# Patient Record
Sex: FEMALE | Race: WHITE | NOT HISPANIC OR LATINO | URBAN - METROPOLITAN AREA
[De-identification: names, ages, dates, MRNs, and addresses within clinical notes are randomized per-mention and may not be internally consistent; named-entity substitution may affect disease eponyms.]

---

## 2020-06-01 ENCOUNTER — OFFICE VISIT (OUTPATIENT)
Dept: URBAN - METROPOLITAN AREA SURGERY CENTER 12 | Facility: SURGERY CENTER | Age: 65
End: 2020-06-01

## 2022-06-04 ENCOUNTER — TELEPHONE ENCOUNTER (OUTPATIENT)
Dept: URBAN - METROPOLITAN AREA CLINIC 68 | Facility: CLINIC | Age: 67
End: 2022-06-04

## 2022-06-04 RX ORDER — OSPEMIFENE 60 MG/1
OSPHENA( 60MG ORAL  DAILY ) INACTIVE -HX ENTRY TABLET, FILM COATED ORAL DAILY
OUTPATIENT
Start: 2016-09-27

## 2022-06-04 RX ORDER — SODIUM SULFATE, POTASSIUM SULFATE, MAGNESIUM SULFATE 17.5; 3.13; 1.6 G/ML; G/ML; G/ML
SOLUTION, CONCENTRATE ORAL AS DIRECTED
Qty: 1 | Refills: 0 | OUTPATIENT
Start: 2016-09-27 | End: 2018-09-25

## 2022-06-04 RX ORDER — SODIUM SULFATE, POTASSIUM SULFATE, MAGNESIUM SULFATE 17.5; 3.13; 1.6 G/ML; G/ML; G/ML
SOLUTION, CONCENTRATE ORAL AS DIRECTED
Qty: 1 | Refills: 0 | OUTPATIENT
Start: 2020-05-12 | End: 2020-05-13

## 2022-06-05 ENCOUNTER — TELEPHONE ENCOUNTER (OUTPATIENT)
Dept: URBAN - METROPOLITAN AREA CLINIC 68 | Facility: CLINIC | Age: 67
End: 2022-06-05

## 2022-06-05 RX ORDER — THYROID, PORCINE 90 MG/1
ARMOUR THYROID( 90MG ORAL  DAILY ) ACTIVE -HX ENTRY TABLET ORAL DAILY
Status: ACTIVE | COMMUNITY
Start: 2020-05-12

## 2022-06-05 RX ORDER — MELATON/THEAN/VAL/LEM/CHAM/LAV 10MG-200MG
VITAMIN E COMPLEX( 1000UNIT ORAL  DAILY ) ACTIVE -HX ENTRY TABLET,IMMED, EXTENDED RELEASE, BIPHASIC ORAL DAILY
Status: ACTIVE | COMMUNITY
Start: 2020-05-12

## 2022-06-05 RX ORDER — LACTOBACIL 2/BIFIDO 1/S.THERMO 450B CELL
VSL#3(  ORAL  SIX TIMES DAILY ) ACTIVE -HX ENTRY PACKET (EA) ORAL
Status: ACTIVE | COMMUNITY
Start: 2020-05-12

## 2022-06-05 RX ORDER — UBIDECARENONE 200 MG
COQ10( 200MG ORAL   ) ACTIVE -HX ENTRY CAPSULE ORAL
Status: ACTIVE | COMMUNITY
Start: 2020-05-12

## 2022-06-05 RX ORDER — AMLODIPINE BESYLATE 5 MG/1
NORVASC( 5MG ORAL  DAILY ) ACTIVE -HX ENTRY TABLET ORAL DAILY
Status: ACTIVE | COMMUNITY
Start: 2020-05-12

## 2022-06-25 ENCOUNTER — TELEPHONE ENCOUNTER (OUTPATIENT)
Age: 67
End: 2022-06-25

## 2022-06-25 RX ORDER — SODIUM SULFATE, POTASSIUM SULFATE, MAGNESIUM SULFATE 17.5; 3.13; 1.6 G/ML; G/ML; G/ML
SOLUTION, CONCENTRATE ORAL AS DIRECTED
Qty: 1 | Refills: 0 | OUTPATIENT
Start: 2020-05-12 | End: 2020-05-13

## 2022-06-25 RX ORDER — SODIUM SULFATE, POTASSIUM SULFATE, MAGNESIUM SULFATE 17.5; 3.13; 1.6 G/ML; G/ML; G/ML
SOLUTION, CONCENTRATE ORAL AS DIRECTED
Qty: 1 | Refills: 0 | OUTPATIENT
Start: 2016-09-27 | End: 2018-09-25

## 2022-06-25 RX ORDER — OSPEMIFENE 60 MG/1
OSPHENA( 60MG ORAL  DAILY ) INACTIVE -HX ENTRY TABLET, FILM COATED ORAL DAILY
OUTPATIENT
Start: 2016-09-27

## 2022-06-26 ENCOUNTER — TELEPHONE ENCOUNTER (OUTPATIENT)
Age: 67
End: 2022-06-26

## 2022-06-26 RX ORDER — ASPIRIN 81 MG/1
BABY ASPIRIN( 81MG ORAL  DAILY ) ACTIVE -HX ENTRY TABLET, COATED ORAL DAILY
Status: ACTIVE | COMMUNITY
Start: 2020-05-12

## 2022-06-26 RX ORDER — VENLAFAXINE HCL 37.5 MG
EFFEXOR( 37.5MG ORAL  EVERY OTHER DAY ) ACTIVE -HX ENTRY CAPSULE, EXT RELEASE 24 HR ORAL EVERY OTHER DAY
Status: ACTIVE | COMMUNITY
Start: 2020-05-12

## 2022-06-26 RX ORDER — THYROID, PORCINE 90 MG/1
ARMOUR THYROID( 90MG ORAL  DAILY ) ACTIVE -HX ENTRY TABLET ORAL DAILY
Status: ACTIVE | COMMUNITY
Start: 2020-05-12

## 2022-06-26 RX ORDER — LACTOBACIL 2/BIFIDO 1/S.THERMO 450B CELL
VSL#3(  ORAL  SIX TIMES DAILY ) ACTIVE -HX ENTRY PACKET (EA) ORAL
Status: ACTIVE | COMMUNITY
Start: 2020-05-12

## 2022-06-26 RX ORDER — CHOLECALCIFEROL (VITAMIN D3) 25 MCG
VITAMIN D( 1000UNIT ORAL  DAILY ) ACTIVE -HX ENTRY TABLET ORAL DAILY
Status: ACTIVE | COMMUNITY
Start: 2020-05-12

## 2022-06-26 RX ORDER — UBIDECARENONE 200 MG
COQ10( 200MG ORAL   ) ACTIVE -HX ENTRY CAPSULE ORAL
Status: ACTIVE | COMMUNITY
Start: 2020-05-12

## 2023-11-30 ENCOUNTER — LAB OUTSIDE AN ENCOUNTER (OUTPATIENT)
Dept: URBAN - METROPOLITAN AREA CLINIC 66 | Facility: CLINIC | Age: 68
End: 2023-11-30

## 2023-11-30 ENCOUNTER — OFFICE VISIT (OUTPATIENT)
Dept: URBAN - METROPOLITAN AREA CLINIC 66 | Facility: CLINIC | Age: 68
End: 2023-11-30
Payer: MEDICARE

## 2023-11-30 VITALS
DIASTOLIC BLOOD PRESSURE: 90 MMHG | HEART RATE: 76 BPM | WEIGHT: 128 LBS | OXYGEN SATURATION: 99 % | HEIGHT: 65 IN | SYSTOLIC BLOOD PRESSURE: 140 MMHG | BODY MASS INDEX: 21.33 KG/M2

## 2023-11-30 DIAGNOSIS — Z80.0 FAMILY HISTORY OF COLON CANCER: ICD-10-CM

## 2023-11-30 DIAGNOSIS — R13.19 ESOPHAGEAL DYSPHAGIA: ICD-10-CM

## 2023-11-30 DIAGNOSIS — K57.90 DIVERTICULOSIS: ICD-10-CM

## 2023-11-30 PROCEDURE — 99204 OFFICE O/P NEW MOD 45 MIN: CPT | Performed by: INTERNAL MEDICINE

## 2023-11-30 RX ORDER — LACTOBACIL 2/BIFIDO 1/S.THERMO 450B CELL
VSL#3(  ORAL  SIX TIMES DAILY ) ACTIVE -HX ENTRY PACKET (EA) ORAL
Status: ACTIVE | COMMUNITY
Start: 2020-05-12

## 2023-11-30 RX ORDER — UBIDECARENONE 200 MG
COQ10( 200MG ORAL   ) ACTIVE -HX ENTRY CAPSULE ORAL
Status: ACTIVE | COMMUNITY
Start: 2020-05-12

## 2023-11-30 RX ORDER — THYROID, PORCINE 90 MG/1
ARMOUR THYROID( 90MG ORAL  DAILY ) ACTIVE -HX ENTRY TABLET ORAL DAILY
Status: ACTIVE | COMMUNITY
Start: 2020-05-12

## 2023-11-30 RX ORDER — ASPIRIN 81 MG/1
BABY ASPIRIN( 81MG ORAL  DAILY ) ACTIVE -HX ENTRY TABLET, COATED ORAL DAILY
Status: DISCONTINUED | COMMUNITY
Start: 2020-05-12

## 2023-11-30 RX ORDER — VENLAFAXINE HCL 37.5 MG
EFFEXOR( 37.5MG ORAL  EVERY OTHER DAY ) ACTIVE -HX ENTRY CAPSULE, EXT RELEASE 24 HR ORAL EVERY OTHER DAY
Status: ACTIVE | COMMUNITY
Start: 2020-05-12

## 2023-11-30 RX ORDER — CHOLECALCIFEROL (VITAMIN D3) 25 MCG
VITAMIN D( 1000UNIT ORAL  DAILY ) ACTIVE -HX ENTRY TABLET ORAL DAILY
Status: ACTIVE | COMMUNITY
Start: 2020-05-12

## 2023-11-30 NOTE — HPI-TODAY'S VISIT:
68 y.o. WF with a family history of colon cancer who has episodic diverticulitis yearly treated with antibiotics who is here for evaluation of non-specific dysphagia with chicken or rice. She did have a colonoscopy in 6/2020 with Dr. Parekh which showed diverticulosis and IH. She denies any SOB. She is seeing a Cardiologist per recommendation of her [facility] physician. She is very active. NO history for food bolus. She understands to have EGD with possible dilation scheduled after cardiac evaluation done.

## 2024-01-16 ENCOUNTER — CLAIMS CREATED FROM THE CLAIM WINDOW (OUTPATIENT)
Dept: URBAN - METROPOLITAN AREA CLINIC 4 | Facility: CLINIC | Age: 69
End: 2024-01-16
Payer: MEDICARE

## 2024-01-16 ENCOUNTER — OUT OF OFFICE VISIT (OUTPATIENT)
Dept: URBAN - METROPOLITAN AREA SURGERY CENTER 12 | Facility: SURGERY CENTER | Age: 69
End: 2024-01-16

## 2024-01-16 ENCOUNTER — CLAIMS CREATED FROM THE CLAIM WINDOW (OUTPATIENT)
Dept: URBAN - METROPOLITAN AREA SURGERY CENTER 12 | Facility: SURGERY CENTER | Age: 69
End: 2024-01-16
Payer: MEDICARE

## 2024-01-16 DIAGNOSIS — K31.89 OTHER DISEASES OF STOMACH AND DUODENUM: ICD-10-CM

## 2024-01-16 DIAGNOSIS — K22.89 OTHER SPECIFIED DISEASE OF ESOPHAGUS: ICD-10-CM

## 2024-01-16 DIAGNOSIS — K44.9 DIAPHRAGMATIC HERNIA WITHOUT OBSTRUCTION OR GANGRENE: ICD-10-CM

## 2024-01-16 DIAGNOSIS — K22.719 BARRETT'S ESOPHAGUS WITH DYSPLASIA, UNSPECIFIED: ICD-10-CM

## 2024-01-16 PROCEDURE — 43248 EGD GUIDE WIRE INSERTION: CPT | Performed by: CLINIC/CENTER

## 2024-01-16 PROCEDURE — 88313 SPECIAL STAINS GROUP 2: CPT | Performed by: PATHOLOGY

## 2024-01-16 PROCEDURE — 43248 EGD GUIDE WIRE INSERTION: CPT | Performed by: INTERNAL MEDICINE

## 2024-01-16 PROCEDURE — 43239 EGD BIOPSY SINGLE/MULTIPLE: CPT | Performed by: INTERNAL MEDICINE

## 2024-01-16 PROCEDURE — 88305 TISSUE EXAM BY PATHOLOGIST: CPT | Performed by: PATHOLOGY

## 2024-01-16 PROCEDURE — 88312 SPECIAL STAINS GROUP 1: CPT | Performed by: PATHOLOGY

## 2024-01-16 PROCEDURE — 43239 EGD BIOPSY SINGLE/MULTIPLE: CPT | Performed by: CLINIC/CENTER

## 2024-01-16 RX ORDER — THYROID, PORCINE 90 MG/1
ARMOUR THYROID( 90MG ORAL  DAILY ) ACTIVE -HX ENTRY TABLET ORAL DAILY
Status: ACTIVE | COMMUNITY
Start: 2020-05-12

## 2024-01-16 RX ORDER — VENLAFAXINE HCL 37.5 MG
EFFEXOR( 37.5MG ORAL  EVERY OTHER DAY ) ACTIVE -HX ENTRY CAPSULE, EXT RELEASE 24 HR ORAL EVERY OTHER DAY
Status: ACTIVE | COMMUNITY
Start: 2020-05-12

## 2024-01-16 RX ORDER — LACTOBACIL 2/BIFIDO 1/S.THERMO 450B CELL
VSL#3(  ORAL  SIX TIMES DAILY ) ACTIVE -HX ENTRY PACKET (EA) ORAL
Status: ACTIVE | COMMUNITY
Start: 2020-05-12

## 2024-01-16 RX ORDER — CHOLECALCIFEROL (VITAMIN D3) 25 MCG
VITAMIN D( 1000UNIT ORAL  DAILY ) ACTIVE -HX ENTRY TABLET ORAL DAILY
Status: ACTIVE | COMMUNITY
Start: 2020-05-12

## 2024-01-16 RX ORDER — UBIDECARENONE 200 MG
COQ10( 200MG ORAL   ) ACTIVE -HX ENTRY CAPSULE ORAL
Status: ACTIVE | COMMUNITY
Start: 2020-05-12

## 2024-02-05 ENCOUNTER — OV EP (OUTPATIENT)
Dept: URBAN - METROPOLITAN AREA CLINIC 68 | Facility: CLINIC | Age: 69
End: 2024-02-05
Payer: MEDICARE

## 2024-02-05 VITALS
WEIGHT: 131 LBS | HEART RATE: 66 BPM | TEMPERATURE: 97.7 F | HEIGHT: 65 IN | BODY MASS INDEX: 21.83 KG/M2 | OXYGEN SATURATION: 98 % | SYSTOLIC BLOOD PRESSURE: 136 MMHG

## 2024-02-05 DIAGNOSIS — K22.70 BARRETT'S ESOPHAGUS WITHOUT DYSPLASIA: ICD-10-CM

## 2024-02-05 DIAGNOSIS — R13.19 ESOPHAGEAL DYSPHAGIA: ICD-10-CM

## 2024-02-05 DIAGNOSIS — Z86.010 HISTORY OF COLON POLYPS: ICD-10-CM

## 2024-02-05 DIAGNOSIS — K57.90 DIVERTICULOSIS: ICD-10-CM

## 2024-02-05 PROBLEM — 397881000: Status: ACTIVE | Noted: 2023-11-30

## 2024-02-05 PROBLEM — 40890009: Status: ACTIVE | Noted: 2024-02-04

## 2024-02-05 PROBLEM — 302914006: Status: ACTIVE | Noted: 2024-02-04

## 2024-02-05 PROCEDURE — 99214 OFFICE O/P EST MOD 30 MIN: CPT

## 2024-02-05 RX ORDER — THYROID, PORCINE 90 MG/1
ARMOUR THYROID( 90MG ORAL  DAILY ) ACTIVE -HX ENTRY TABLET ORAL DAILY
Status: ACTIVE | COMMUNITY
Start: 2020-05-12

## 2024-02-05 RX ORDER — VENLAFAXINE HCL 37.5 MG
EFFEXOR( 37.5MG ORAL  EVERY OTHER DAY ) ACTIVE -HX ENTRY CAPSULE, EXT RELEASE 24 HR ORAL EVERY OTHER DAY
Status: ACTIVE | COMMUNITY
Start: 2020-05-12

## 2024-02-05 RX ORDER — UBIDECARENONE 200 MG
COQ10( 200MG ORAL   ) ACTIVE -HX ENTRY CAPSULE ORAL
Status: ACTIVE | COMMUNITY
Start: 2020-05-12

## 2024-02-05 RX ORDER — LACTOBACIL 2/BIFIDO 1/S.THERMO 450B CELL
VSL#3(  ORAL  SIX TIMES DAILY ) ACTIVE -HX ENTRY PACKET (EA) ORAL
Status: ACTIVE | COMMUNITY
Start: 2020-05-12

## 2024-02-05 RX ORDER — PANTOPRAZOLE SODIUM 40 MG/1
1 TABLET ON AN EMPTY STOMACH 30 MINUTES BEFORE MEAL TABLET, DELAYED RELEASE ORAL ONCE A DAY
Qty: 90 | Refills: 3 | OUTPATIENT
Start: 2024-02-04

## 2024-02-05 RX ORDER — CHOLECALCIFEROL (VITAMIN D3) 25 MCG
VITAMIN D( 1000UNIT ORAL  DAILY ) ACTIVE -HX ENTRY TABLET ORAL DAILY
Status: ACTIVE | COMMUNITY
Start: 2020-05-12

## 2024-02-05 NOTE — HPI-TODAY'S VISIT:
67 y/o F with history of colon polyps (colonoscopy 2020), MALT lymphoma, and hypothyroidism, presenting for follow up of dysphagia s/p recent EGD/DIL(1/16/24) wit Dr. Alejo. She reports dysphagia has completely resolved since dilation and denies recurrence. She notes she has never had any noticeable reflux of heartburn symptoms and denies any further acute complaint. Discussed EGD (1/16/2024) with findings of chino's esophagus without evidence of dysplasia or malignancy. Extensive discussion regarding increased risk of malignant potential for Chino's esophagus and importance of anti-reflux measures. Possible adverse effect of prolonged PPI use discussed, although benefit out weighs risk in setting of Chino's. EGD for surveillance recommended in 1 yr, followed by every 2-3 years. Continue pantoprazole 40mg once daily and return to clinic if this fails to well control symptoms. Patient denies nausea, vomiting, odynophagia, heartburn, abdominal pain, diarrhea, constipation, GI bleeding, or unintentional weight loss

## 2024-05-07 ENCOUNTER — DASHBOARD ENCOUNTERS (OUTPATIENT)
Age: 69
End: 2024-05-07

## 2024-05-08 ENCOUNTER — OFFICE VISIT (OUTPATIENT)
Dept: URBAN - METROPOLITAN AREA CLINIC 68 | Facility: CLINIC | Age: 69
End: 2024-05-08
Payer: MEDICARE

## 2024-05-08 VITALS
SYSTOLIC BLOOD PRESSURE: 118 MMHG | BODY MASS INDEX: 21.66 KG/M2 | HEIGHT: 65 IN | WEIGHT: 130 LBS | DIASTOLIC BLOOD PRESSURE: 80 MMHG

## 2024-05-08 DIAGNOSIS — K22.70 BARRETT'S ESOPHAGUS WITHOUT DYSPLASIA: ICD-10-CM

## 2024-05-08 DIAGNOSIS — Z87.19 HISTORY OF DIVERTICULITIS: ICD-10-CM

## 2024-05-08 PROCEDURE — 99213 OFFICE O/P EST LOW 20 MIN: CPT | Performed by: INTERNAL MEDICINE

## 2024-05-08 RX ORDER — UBIDECARENONE 200 MG
COQ10( 200MG ORAL   ) ACTIVE -HX ENTRY CAPSULE ORAL
Status: ACTIVE | COMMUNITY
Start: 2020-05-12

## 2024-05-08 RX ORDER — PANTOPRAZOLE SODIUM 40 MG/1
1 TABLET ON AN EMPTY STOMACH 30 MINUTES BEFORE MEAL TABLET, DELAYED RELEASE ORAL ONCE A DAY
Qty: 90 | Refills: 3 | Status: ACTIVE | COMMUNITY
Start: 2024-02-04

## 2024-05-08 RX ORDER — CHOLECALCIFEROL (VITAMIN D3) 25 MCG
VITAMIN D( 1000UNIT ORAL  DAILY ) ACTIVE -HX ENTRY TABLET ORAL DAILY
Status: ACTIVE | COMMUNITY
Start: 2020-05-12

## 2024-05-08 RX ORDER — THYROID, PORCINE 90 MG/1
ARMOUR THYROID( 90MG ORAL  DAILY ) ACTIVE -HX ENTRY TABLET ORAL DAILY
Status: ACTIVE | COMMUNITY
Start: 2020-05-12

## 2024-05-08 RX ORDER — LACTOBACIL 2/BIFIDO 1/S.THERMO 450B CELL
VSL#3(  ORAL  SIX TIMES DAILY ) ACTIVE -HX ENTRY PACKET (EA) ORAL
Status: ACTIVE | COMMUNITY
Start: 2020-05-12

## 2024-05-08 RX ORDER — VENLAFAXINE HCL 37.5 MG
EFFEXOR( 37.5MG ORAL  EVERY OTHER DAY ) ACTIVE -HX ENTRY CAPSULE, EXT RELEASE 24 HR ORAL EVERY OTHER DAY
Status: ACTIVE | COMMUNITY
Start: 2020-05-12

## 2024-10-25 ENCOUNTER — OFFICE VISIT (OUTPATIENT)
Dept: URBAN - METROPOLITAN AREA CLINIC 68 | Facility: CLINIC | Age: 69
End: 2024-10-25
Payer: MEDICARE

## 2024-10-25 ENCOUNTER — LAB OUTSIDE AN ENCOUNTER (OUTPATIENT)
Dept: URBAN - METROPOLITAN AREA CLINIC 68 | Facility: CLINIC | Age: 69
End: 2024-10-25

## 2024-10-25 VITALS
HEART RATE: 78 BPM | WEIGHT: 134 LBS | HEIGHT: 65 IN | OXYGEN SATURATION: 98 % | SYSTOLIC BLOOD PRESSURE: 140 MMHG | DIASTOLIC BLOOD PRESSURE: 90 MMHG | BODY MASS INDEX: 22.33 KG/M2

## 2024-10-25 DIAGNOSIS — K21.9 GASTROESOPHAGEAL REFLUX DISEASE WITHOUT ESOPHAGITIS: ICD-10-CM

## 2024-10-25 DIAGNOSIS — K22.70 BARRETT'S ESOPHAGUS WITHOUT DYSPLASIA: ICD-10-CM

## 2024-10-25 DIAGNOSIS — K57.92 DIVERTICULITIS: ICD-10-CM

## 2024-10-25 PROBLEM — 266435005: Status: ACTIVE | Noted: 2024-10-25

## 2024-10-25 PROBLEM — 307496006: Status: ACTIVE | Noted: 2024-10-25

## 2024-10-25 PROCEDURE — 99214 OFFICE O/P EST MOD 30 MIN: CPT | Performed by: INTERNAL MEDICINE

## 2024-10-25 RX ORDER — THYROID, PORCINE 90 MG/1
ARMOUR THYROID( 90MG ORAL  DAILY ) ACTIVE -HX ENTRY TABLET ORAL DAILY
Status: ACTIVE | COMMUNITY
Start: 2020-05-12

## 2024-10-25 RX ORDER — LACTOBACIL 2/BIFIDO 1/S.THERMO 450B CELL
VSL#3(  ORAL  SIX TIMES DAILY ) ACTIVE -HX ENTRY PACKET (EA) ORAL
Status: ACTIVE | COMMUNITY
Start: 2020-05-12

## 2024-10-25 RX ORDER — VENLAFAXINE HCL 37.5 MG
EFFEXOR( 37.5MG ORAL  EVERY OTHER DAY ) ACTIVE -HX ENTRY CAPSULE, EXT RELEASE 24 HR ORAL EVERY OTHER DAY
Status: DISCONTINUED | COMMUNITY
Start: 2020-05-12

## 2024-10-25 RX ORDER — PANTOPRAZOLE SODIUM 40 MG/1
1 TABLET ON AN EMPTY STOMACH 30 MINUTES BEFORE MEAL TABLET, DELAYED RELEASE ORAL ONCE A DAY
Qty: 90 | Refills: 3 | Status: ACTIVE | COMMUNITY
Start: 2024-02-04

## 2024-10-25 RX ORDER — CIPROFLOXACIN 500 MG/1
1 TABLET TABLET, FILM COATED ORAL
Qty: 28 TABLET | Refills: 1 | OUTPATIENT
Start: 2024-10-25 | End: 2024-11-21

## 2024-10-25 RX ORDER — SOD SULF/POT CHLORIDE/MAG SULF 1.479 G
12 TABLETS THE FIRST DOSE THE EVENING BEFORE AND SECOND DOSE THE MORNING OF COLONOSCOPY TABLET ORAL TWICE A DAY
Qty: 24 | OUTPATIENT
Start: 2024-10-25 | End: 2024-10-26

## 2024-10-25 RX ORDER — UBIDECARENONE 200 MG
COQ10( 200MG ORAL   ) ACTIVE -HX ENTRY CAPSULE ORAL
Status: ACTIVE | COMMUNITY
Start: 2020-05-12

## 2024-10-25 RX ORDER — METRONIDAZOLE 500 MG/1
1 TABLET TABLET ORAL THREE TIMES A DAY
Qty: 42 TABLET | Refills: 1 | OUTPATIENT
Start: 2024-10-25 | End: 2024-11-21

## 2024-10-25 RX ORDER — PANTOPRAZOLE SODIUM 40 MG/1
1 TABLET TABLET, DELAYED RELEASE ORAL ONCE A DAY
Qty: 90 TABLET | Refills: 3 | OUTPATIENT
Start: 2024-10-25

## 2024-10-25 RX ORDER — CHOLECALCIFEROL (VITAMIN D3) 25 MCG
VITAMIN D( 1000UNIT ORAL  DAILY ) ACTIVE -HX ENTRY TABLET ORAL DAILY
Status: ACTIVE | COMMUNITY
Start: 2020-05-12

## 2024-10-25 NOTE — HPI-TODAY'S VISIT:
69 y.o. WF with SSBE and diverticulosis who describes having a total of 5 episodes of diverticulitis this year who is here for follow up. She did have recent LLQ pain and did have a CT scan of abdomen/pelvis this week at [facility] which showed diverticulitis, no perforation. She did have a colonoscopy in 2020 with Dr. Parekh which showed diverticulosis and IH. She does have a personal history of colon polyps. Recent EGD on 1/2024 showed and gastritis and she is managed on Pantoprazole 40mg/d. She did have reent epsidoe of LLQ pain which has improved with  antibotics. Overall, she is doing well at this time and will plan to schedule both EGD and colonoscopy early next year.

## 2024-11-26 ENCOUNTER — WEB ENCOUNTER (OUTPATIENT)
Dept: URBAN - METROPOLITAN AREA CLINIC 68 | Facility: CLINIC | Age: 69
End: 2024-11-26

## 2024-12-03 ENCOUNTER — CLAIMS CREATED FROM THE CLAIM WINDOW (OUTPATIENT)
Dept: URBAN - METROPOLITAN AREA CLINIC 4 | Facility: CLINIC | Age: 69
End: 2024-12-03
Payer: MEDICARE

## 2024-12-03 ENCOUNTER — CLAIMS CREATED FROM THE CLAIM WINDOW (OUTPATIENT)
Dept: URBAN - METROPOLITAN AREA SURGERY CENTER 12 | Facility: SURGERY CENTER | Age: 69
End: 2024-12-03
Payer: MEDICARE

## 2024-12-03 DIAGNOSIS — Z09 ENCNTR FOR F/U EXAM AFT TRTMT FOR COND OTH THAN MALIG NEOPLM: ICD-10-CM

## 2024-12-03 DIAGNOSIS — K57.30 DIVERTICULOSIS OF LARGE INTESTINE WITHOUT PERFORATION OR ABSCESS WITHOUT BLEEDING: ICD-10-CM

## 2024-12-03 DIAGNOSIS — K64.0 FIRST DEGREE HEMORRHOIDS: ICD-10-CM

## 2024-12-03 DIAGNOSIS — K31.89 OTHER DISEASES OF STOMACH AND DUODENUM: ICD-10-CM

## 2024-12-03 DIAGNOSIS — Z86.0100 PERSONAL HISTORY OF COLON POLYPS, UNSPECIFIED: ICD-10-CM

## 2024-12-03 DIAGNOSIS — Z87.19 PERSONAL HISTORY OF OTHER DISEASES OF THE DIGESTIVE SYSTEM: ICD-10-CM

## 2024-12-03 DIAGNOSIS — K29.70 GASTRITIS, UNSPECIFIED, WITHOUT BLEEDING: ICD-10-CM

## 2024-12-03 DIAGNOSIS — K31.7 POLYP OF STOMACH AND DUODENUM: ICD-10-CM

## 2024-12-03 DIAGNOSIS — K22.70 BARRETT ESOPHAGUS WITHOUT DYSPLASIA: ICD-10-CM

## 2024-12-03 DIAGNOSIS — K22.89 OTHER SPECIFIED DISEASE OF ESOPHAGUS: ICD-10-CM

## 2024-12-03 DIAGNOSIS — K44.9 DIAPHRAGMATIC HERNIA WITHOUT OBSTRUCTION OR GANGRENE: ICD-10-CM

## 2024-12-03 PROCEDURE — 88342 IMHCHEM/IMCYTCHM 1ST ANTB: CPT | Performed by: STUDENT IN AN ORGANIZED HEALTH CARE EDUCATION/TRAINING PROGRAM

## 2024-12-03 PROCEDURE — 43239 EGD BIOPSY SINGLE/MULTIPLE: CPT | Performed by: CLINIC/CENTER

## 2024-12-03 PROCEDURE — 88305 TISSUE EXAM BY PATHOLOGIST: CPT | Performed by: STUDENT IN AN ORGANIZED HEALTH CARE EDUCATION/TRAINING PROGRAM

## 2024-12-03 PROCEDURE — 43239 EGD BIOPSY SINGLE/MULTIPLE: CPT | Performed by: INTERNAL MEDICINE

## 2024-12-03 PROCEDURE — 45378 DIAGNOSTIC COLONOSCOPY: CPT | Performed by: CLINIC/CENTER

## 2024-12-03 PROCEDURE — 45378 DIAGNOSTIC COLONOSCOPY: CPT | Performed by: INTERNAL MEDICINE

## 2024-12-03 PROCEDURE — 00813 ANES UPR LWR GI NDSC PX: CPT | Performed by: NURSE ANESTHETIST, CERTIFIED REGISTERED

## 2024-12-03 RX ORDER — PANTOPRAZOLE SODIUM 40 MG/1
1 TABLET ON AN EMPTY STOMACH 30 MINUTES BEFORE MEAL TABLET, DELAYED RELEASE ORAL ONCE A DAY
Qty: 90 | Refills: 3 | Status: ACTIVE | COMMUNITY
Start: 2024-02-04

## 2024-12-03 RX ORDER — THYROID, PORCINE 90 MG/1
ARMOUR THYROID( 90MG ORAL  DAILY ) ACTIVE -HX ENTRY TABLET ORAL DAILY
Status: ACTIVE | COMMUNITY
Start: 2020-05-12

## 2024-12-03 RX ORDER — LACTOBACIL 2/BIFIDO 1/S.THERMO 450B CELL
VSL#3(  ORAL  SIX TIMES DAILY ) ACTIVE -HX ENTRY PACKET (EA) ORAL
Status: ACTIVE | COMMUNITY
Start: 2020-05-12

## 2024-12-03 RX ORDER — PANTOPRAZOLE SODIUM 40 MG/1
1 TABLET TABLET, DELAYED RELEASE ORAL ONCE A DAY
Qty: 90 TABLET | Refills: 3 | Status: ACTIVE | COMMUNITY
Start: 2024-10-25

## 2024-12-03 RX ORDER — CHOLECALCIFEROL (VITAMIN D3) 25 MCG
VITAMIN D( 1000UNIT ORAL  DAILY ) ACTIVE -HX ENTRY TABLET ORAL DAILY
Status: ACTIVE | COMMUNITY
Start: 2020-05-12

## 2024-12-03 RX ORDER — UBIDECARENONE 200 MG
COQ10( 200MG ORAL   ) ACTIVE -HX ENTRY CAPSULE ORAL
Status: ACTIVE | COMMUNITY
Start: 2020-05-12

## 2024-12-13 ENCOUNTER — WEB ENCOUNTER (OUTPATIENT)
Dept: URBAN - METROPOLITAN AREA CLINIC 68 | Facility: CLINIC | Age: 69
End: 2024-12-13

## 2024-12-20 ENCOUNTER — OFFICE VISIT (OUTPATIENT)
Dept: URBAN - METROPOLITAN AREA CLINIC 68 | Facility: CLINIC | Age: 69
End: 2024-12-20

## 2024-12-24 ENCOUNTER — WEB ENCOUNTER (OUTPATIENT)
Dept: URBAN - METROPOLITAN AREA CLINIC 68 | Facility: CLINIC | Age: 69
End: 2024-12-24

## 2025-05-05 ENCOUNTER — OFFICE VISIT (OUTPATIENT)
Dept: URBAN - METROPOLITAN AREA CLINIC 68 | Facility: CLINIC | Age: 70
End: 2025-05-05
Payer: MEDICARE

## 2025-05-05 ENCOUNTER — LAB OUTSIDE AN ENCOUNTER (OUTPATIENT)
Dept: URBAN - METROPOLITAN AREA CLINIC 68 | Facility: CLINIC | Age: 70
End: 2025-05-05

## 2025-05-05 DIAGNOSIS — R10.32 ABDOMINAL DISCOMFORT IN LEFT LOWER QUADRANT: ICD-10-CM

## 2025-05-05 PROCEDURE — 99213 OFFICE O/P EST LOW 20 MIN: CPT | Performed by: INTERNAL MEDICINE

## 2025-05-05 RX ORDER — PANTOPRAZOLE SODIUM 40 MG/1
1 TABLET TABLET, DELAYED RELEASE ORAL ONCE A DAY
Qty: 90 TABLET | Refills: 3 | Status: DISCONTINUED | COMMUNITY
Start: 2024-10-25

## 2025-05-05 RX ORDER — LACTOBACIL 2/BIFIDO 1/S.THERMO 450B CELL
VSL#3(  ORAL  SIX TIMES DAILY ) ACTIVE -HX ENTRY PACKET (EA) ORAL
Status: ACTIVE | COMMUNITY
Start: 2020-05-12

## 2025-05-05 RX ORDER — UBIDECARENONE 200 MG
COQ10( 200MG ORAL   ) ACTIVE -HX ENTRY CAPSULE ORAL
Status: ACTIVE | COMMUNITY
Start: 2020-05-12

## 2025-05-05 RX ORDER — CHOLECALCIFEROL (VITAMIN D3) 25 MCG
VITAMIN D( 1000UNIT ORAL  DAILY ) ACTIVE -HX ENTRY TABLET ORAL DAILY
Status: ACTIVE | COMMUNITY
Start: 2020-05-12

## 2025-05-05 RX ORDER — THYROID, PORCINE 90 MG/1
ARMOUR THYROID( 90MG ORAL  DAILY ) ACTIVE -HX ENTRY TABLET ORAL DAILY
Status: ACTIVE | COMMUNITY
Start: 2020-05-12

## 2025-05-05 RX ORDER — PANTOPRAZOLE SODIUM 40 MG/1
1 TABLET ON AN EMPTY STOMACH 30 MINUTES BEFORE MEAL TABLET, DELAYED RELEASE ORAL ONCE A DAY
Qty: 90 | Refills: 3 | Status: ACTIVE | COMMUNITY
Start: 2024-02-04

## 2025-05-05 NOTE — HPI-TODAY'S VISIT:
69 y.o. WF with SSBE and diverticulosis who describes having a total of 5 episodes of diverticulitis who is s/p a laparoscopic partial colectomy in 12/2024 with Dr. Thompson who is here for evaluation of non-specific LLQ discomfort. She denies any severe pain. She is s/p a colonoscopy in 12/2024 which showed diverticulosis and IH.  She did have a colonoscopy in 2020 with Dr. Parekh which showed diverticulosis and IH. She does have a personal history of colon polyps. She denies any gib, no n/v, no f/c. Her abdominal discomfort is likely related to adhesions vs other.

## 2025-05-21 ENCOUNTER — TELEPHONE ENCOUNTER (OUTPATIENT)
Dept: URBAN - METROPOLITAN AREA CLINIC 68 | Facility: CLINIC | Age: 70
End: 2025-05-21